# Patient Record
Sex: FEMALE | Race: WHITE
[De-identification: names, ages, dates, MRNs, and addresses within clinical notes are randomized per-mention and may not be internally consistent; named-entity substitution may affect disease eponyms.]

---

## 2019-08-24 ENCOUNTER — HOSPITAL ENCOUNTER (EMERGENCY)
Dept: HOSPITAL 4 - SED | Age: 39
LOS: 1 days | Discharge: HOME | End: 2019-08-25
Payer: COMMERCIAL

## 2019-08-24 VITALS — HEIGHT: 66 IN | WEIGHT: 180 LBS | BODY MASS INDEX: 28.93 KG/M2

## 2019-08-24 VITALS — SYSTOLIC BLOOD PRESSURE: 168 MMHG

## 2019-08-24 DIAGNOSIS — Z79.899: ICD-10-CM

## 2019-08-24 DIAGNOSIS — G43.A0: Primary | ICD-10-CM

## 2019-08-24 LAB
ALBUMIN SERPL BCP-MCNC: 4.6 G/DL (ref 3.4–4.8)
ALT SERPL W P-5'-P-CCNC: 38 U/L (ref 12–78)
ANION GAP SERPL CALCULATED.3IONS-SCNC: 16 MMOL/L (ref 5–15)
AST SERPL W P-5'-P-CCNC: 17 U/L (ref 10–37)
BASOPHILS NFR BLD MANUAL: 0 % (ref 0–2)
BILIRUB SERPL-MCNC: 0.5 MG/DL (ref 0–1)
BUN SERPL-MCNC: 14 MG/DL (ref 8–21)
CALCIUM SERPL-MCNC: 11.4 MG/DL (ref 8.4–11)
CHLORIDE SERPL-SCNC: 107 MMOL/L (ref 98–107)
CREAT SERPL-MCNC: 1.2 MG/DL (ref 0.55–1.3)
EOSINOPHIL NFR BLD MANUAL: 0 % (ref 0–7)
ERYTHROCYTE [DISTWIDTH] IN BLOOD BY AUTOMATED COUNT: 15.3 % (ref 9–15)
GFR SERPL CREATININE-BSD FRML MDRD: 65 ML/MIN (ref 90–?)
GLUCOSE SERPL-MCNC: 174 MG/DL (ref 70–99)
HCT VFR BLD AUTO: 43.6 % (ref 36–48)
HGB BLD-MCNC: 14.7 G/DL (ref 12–16)
LIPASE SERPL-CCNC: 176 U/L (ref 73–393)
LYMPHOCYTES NFR BLD MANUAL: 9 % (ref 20–46)
MCH RBC QN AUTO: 29 PG (ref 27–31)
MCHC RBC AUTO-ENTMCNC: 34 % (ref 32–36)
MCV RBC AUTO: 86 FL (ref 79–98)
MONOCYTES # BLD MANUAL: 1 % (ref 0–11)
NEUTS BAND NFR BLD MANUAL: 2 % (ref 0–6)
PLATELET # BLD AUTO: 409 K/UL (ref 130–430)
POTASSIUM SERPL-SCNC: 3.2 MMOL/L (ref 3.5–5.1)
RBC # BLD AUTO: 5.08 MIL/UL (ref 4.2–6.2)
SODIUM SERPLBLD-SCNC: 143 MMOL/L (ref 136–145)
WBC # BLD AUTO: 22.2 K/UL (ref 4.8–10.8)

## 2019-08-24 PROCEDURE — 83690 ASSAY OF LIPASE: CPT

## 2019-08-24 PROCEDURE — 85025 COMPLETE CBC W/AUTO DIFF WBC: CPT

## 2019-08-24 PROCEDURE — 96374 THER/PROPH/DIAG INJ IV PUSH: CPT

## 2019-08-24 PROCEDURE — 87040 BLOOD CULTURE FOR BACTERIA: CPT

## 2019-08-24 PROCEDURE — 85027 COMPLETE CBC AUTOMATED: CPT

## 2019-08-24 PROCEDURE — 96361 HYDRATE IV INFUSION ADD-ON: CPT

## 2019-08-24 PROCEDURE — 99283 EMERGENCY DEPT VISIT LOW MDM: CPT

## 2019-08-24 PROCEDURE — C9113 INJ PANTOPRAZOLE SODIUM, VIA: HCPCS

## 2019-08-24 PROCEDURE — 80053 COMPREHEN METABOLIC PANEL: CPT

## 2019-08-24 PROCEDURE — 83605 ASSAY OF LACTIC ACID: CPT

## 2019-08-24 PROCEDURE — 85007 BL SMEAR W/DIFF WBC COUNT: CPT

## 2019-08-24 PROCEDURE — 36415 COLL VENOUS BLD VENIPUNCTURE: CPT

## 2019-08-24 PROCEDURE — 81025 URINE PREGNANCY TEST: CPT

## 2019-08-24 PROCEDURE — 96375 TX/PRO/DX INJ NEW DRUG ADDON: CPT

## 2019-08-24 PROCEDURE — 96376 TX/PRO/DX INJ SAME DRUG ADON: CPT

## 2019-08-24 PROCEDURE — 81002 URINALYSIS NONAUTO W/O SCOPE: CPT

## 2019-08-24 NOTE — NUR
Dr Garcia made aware WBC elevated and verify if he wants to order 
antibiotic.awaiting for new order.

## 2019-08-24 NOTE — NUR
# 20 gauge angiocath placed to left wrist.  Use of asceptic technique.  Opsite 
placed over site.  Blood return noted.  Blood for lab drawn from site.  Flushed 
with 10 cc of normal saline.  No evidence of infiltration noted.  Patient 
tolerated well.

## 2019-08-24 NOTE — NUR
patient arrived Aox4 with c/o vomiting all day. patient states she has a 
history of IBS. patient is a poor historian states vomiting has gone on for 8 
years uncontrollable. patient has distant bowelsounds throughout abd. patient 
is diaphoretic and breathing fast. applied comfort/calming measures. denies 
loose stool. patient denies drug use of any kind. no other complaint or injury 
at this time.

## 2019-08-24 NOTE — NUR
# 22 gauge angiocath placed to right hand. Use of asceptic technique.  Opsite 
placed over site.  Blood return noted.  Blood for lab drawn from site.  Flushed 
with 10 cc of normal saline.  No evidence of infiltration noted.  Patient 
tolerated well.

## 2019-08-25 VITALS — SYSTOLIC BLOOD PRESSURE: 140 MMHG

## 2019-08-25 LAB
ALBUMIN SERPL BCP-MCNC: 3.7 G/DL (ref 3.4–4.8)
ALT SERPL W P-5'-P-CCNC: 30 U/L (ref 12–78)
ANION GAP SERPL CALCULATED.3IONS-SCNC: 11 MMOL/L (ref 5–15)
AST SERPL W P-5'-P-CCNC: 15 U/L (ref 10–37)
BASOPHILS # BLD AUTO: 0 K/UL (ref 0–0.2)
BASOPHILS NFR BLD AUTO: 0.2 % (ref 0–2)
BILIRUB SERPL-MCNC: 0.3 MG/DL (ref 0–1)
BUN SERPL-MCNC: 12 MG/DL (ref 8–21)
CALCIUM SERPL-MCNC: 9 MG/DL (ref 8.4–11)
CHLORIDE SERPL-SCNC: 111 MMOL/L (ref 98–107)
CREAT SERPL-MCNC: 0.9 MG/DL (ref 0.55–1.3)
EOSINOPHIL # BLD AUTO: 0 K/UL (ref 0–0.4)
EOSINOPHIL NFR BLD AUTO: 0.1 % (ref 0–4)
ERYTHROCYTE [DISTWIDTH] IN BLOOD BY AUTOMATED COUNT: 15.4 % (ref 9–15)
GFR SERPL CREATININE-BSD FRML MDRD: 91 ML/MIN (ref 90–?)
GLUCOSE SERPL-MCNC: 165 MG/DL (ref 70–99)
HCT VFR BLD AUTO: 37.3 % (ref 36–48)
HGB BLD-MCNC: 12.7 G/DL (ref 12–16)
LYMPHOCYTES # BLD AUTO: 0.8 K/UL (ref 1–5.5)
LYMPHOCYTES NFR BLD AUTO: 4 % (ref 20.5–51.5)
MCH RBC QN AUTO: 29 PG (ref 27–31)
MCHC RBC AUTO-ENTMCNC: 34 % (ref 32–36)
MCV RBC AUTO: 86 FL (ref 79–98)
MONOCYTES # BLD MANUAL: 0.2 K/UL (ref 0–1)
MONOCYTES # BLD MANUAL: 1.3 % (ref 1.7–9.3)
NEUTROPHILS # BLD AUTO: 17.9 K/UL (ref 1.8–7.7)
NEUTROPHILS NFR BLD AUTO: 94.4 % (ref 40–70)
PLATELET # BLD AUTO: 289 K/UL (ref 130–430)
POTASSIUM SERPL-SCNC: 3.9 MMOL/L (ref 3.5–5.1)
RBC # BLD AUTO: 4.34 MIL/UL (ref 4.2–6.2)
SODIUM SERPLBLD-SCNC: 145 MMOL/L (ref 136–145)
WBC # BLD AUTO: 18.9 K/UL (ref 4.8–10.8)

## 2019-08-25 NOTE — NUR
Patient given written and verbal discharge instructions and verbalizes 
understanding.  ER MD discussed with patient the results and treatment 
provided. Patient in stable condition. ID arm band removed. IV catheter removed 
intact and dressing applied, no active bleeding. Rx of Compazine and Benadryl 
given. Patient educated on pain management and to follow up with PMD. Pain 
Scale 0/10 Opportunity for questions provided and answered. Medication side 
effect fact sheet provided.

## 2023-03-18 ENCOUNTER — HOSPITAL ENCOUNTER (INPATIENT)
Dept: HOSPITAL 4 - SED | Age: 43
LOS: 3 days | Discharge: HOME | DRG: 249 | End: 2023-03-21
Attending: FAMILY MEDICINE | Admitting: FAMILY MEDICINE
Payer: COMMERCIAL

## 2023-03-18 VITALS — BODY MASS INDEX: 25.39 KG/M2 | HEIGHT: 66 IN | WEIGHT: 158 LBS

## 2023-03-18 VITALS — SYSTOLIC BLOOD PRESSURE: 178 MMHG

## 2023-03-18 DIAGNOSIS — K58.9: ICD-10-CM

## 2023-03-18 DIAGNOSIS — R74.01: ICD-10-CM

## 2023-03-18 DIAGNOSIS — R65.10: ICD-10-CM

## 2023-03-18 DIAGNOSIS — K21.00: ICD-10-CM

## 2023-03-18 DIAGNOSIS — Z20.822: ICD-10-CM

## 2023-03-18 DIAGNOSIS — Z79.899: ICD-10-CM

## 2023-03-18 DIAGNOSIS — R11.15: Primary | ICD-10-CM

## 2023-03-18 DIAGNOSIS — Z90.49: ICD-10-CM

## 2023-03-18 DIAGNOSIS — E87.6: ICD-10-CM

## 2023-03-18 DIAGNOSIS — E88.09: ICD-10-CM

## 2023-03-18 DIAGNOSIS — R73.9: ICD-10-CM

## 2023-03-18 DIAGNOSIS — R10.13: ICD-10-CM

## 2023-03-18 PROCEDURE — C9113 INJ PANTOPRAZOLE SODIUM, VIA: HCPCS

## 2023-03-19 VITALS — SYSTOLIC BLOOD PRESSURE: 122 MMHG

## 2023-03-19 VITALS — SYSTOLIC BLOOD PRESSURE: 135 MMHG

## 2023-03-19 VITALS — SYSTOLIC BLOOD PRESSURE: 143 MMHG

## 2023-03-19 VITALS — SYSTOLIC BLOOD PRESSURE: 161 MMHG

## 2023-03-19 VITALS — SYSTOLIC BLOOD PRESSURE: 139 MMHG

## 2023-03-19 VITALS — SYSTOLIC BLOOD PRESSURE: 140 MMHG

## 2023-03-19 LAB
ALBUMIN SERPL BCP-MCNC: 4.5 G/DL (ref 3.4–4.8)
ALT SERPL W P-5'-P-CCNC: 111 U/L (ref 12–78)
ANION GAP SERPL CALCULATED.3IONS-SCNC: 15 MMOL/L (ref 5–15)
AST SERPL W P-5'-P-CCNC: 59 U/L (ref 10–37)
BASOPHILS NFR BLD MANUAL: 0 % (ref 0–2)
BILIRUB SERPL-MCNC: 0.3 MG/DL (ref 0–1)
BUN SERPL-MCNC: 6 MG/DL (ref 8–21)
CALCIUM SERPL-MCNC: 10.1 MG/DL (ref 8.4–11)
CHLORIDE SERPL-SCNC: 96 MMOL/L (ref 98–107)
CREAT SERPL-MCNC: 0.98 MG/DL (ref 0.55–1.3)
EOSINOPHIL NFR BLD MANUAL: 0 % (ref 0–7)
ERYTHROCYTE [DISTWIDTH] IN BLOOD BY AUTOMATED COUNT: 14.6 % (ref 9–15)
GFR SERPL CREATININE-BSD FRML MDRD: 80 ML/MIN (ref 90–?)
GLUCOSE SERPL-MCNC: 186 MG/DL (ref 70–99)
HCT VFR BLD AUTO: 46.2 % (ref 36–48)
HGB BLD-MCNC: 15.5 G/DL (ref 12–16)
INR PPP: 1.1 (ref 0.8–1.2)
INR PPP: 1.1 (ref 0.8–1.2)
LIPASE SERPL-CCNC: 227 U/L (ref 73–393)
LYMPHOCYTES NFR BLD MANUAL: 3 % (ref 20–46)
MCH RBC QN AUTO: 29 PG (ref 27–31)
MCHC RBC AUTO-ENTMCNC: 34 % (ref 32–36)
MCV RBC AUTO: 87 FL (ref 79–98)
MONOCYTES # BLD MANUAL: 1 % (ref 0–11)
PLATELET # BLD AUTO: 332 K/UL (ref 130–430)
PROTHROMBIN TIME: 11.1 SECS (ref 9.5–12.5)
PROTHROMBIN TIME: 11.4 SECS (ref 9.5–12.5)
RBC # BLD AUTO: 5.31 MIL/UL (ref 4.2–6.2)
WBC # BLD AUTO: 20.7 K/UL (ref 4.8–10.8)

## 2023-03-19 RX ADMIN — SODIUM CHLORIDE SCH MG: 9 INJECTION, SOLUTION INTRAVENOUS at 20:21

## 2023-03-19 RX ADMIN — KETOROLAC TROMETHAMINE PRN MG: 30 INJECTION, SOLUTION INTRAMUSCULAR; INTRAVENOUS at 07:02

## 2023-03-19 RX ADMIN — SUCRALFATE SCH GM: 1 TABLET ORAL at 20:21

## 2023-03-19 RX ADMIN — DICYCLOMINE HYDROCHLORIDE SCH MG: 10 CAPSULE ORAL at 20:21

## 2023-03-19 RX ADMIN — SODIUM CHLORIDE SCH MG: 9 INJECTION, SOLUTION INTRAVENOUS at 09:00

## 2023-03-20 VITALS — SYSTOLIC BLOOD PRESSURE: 144 MMHG

## 2023-03-20 VITALS — SYSTOLIC BLOOD PRESSURE: 150 MMHG

## 2023-03-20 VITALS — SYSTOLIC BLOOD PRESSURE: 139 MMHG

## 2023-03-20 VITALS — SYSTOLIC BLOOD PRESSURE: 152 MMHG

## 2023-03-20 LAB
ALBUMIN SERPL BCP-MCNC: 3.3 G/DL (ref 3.4–4.8)
ALT SERPL W P-5'-P-CCNC: 81 U/L (ref 12–78)
AMPHETAMINES UR QL SCN: NEGATIVE
ANION GAP SERPL CALCULATED.3IONS-SCNC: 9 MMOL/L (ref 5–15)
AST SERPL W P-5'-P-CCNC: 38 U/L (ref 10–37)
BARBITURATES UR QL SCN: NEGATIVE
BASOPHILS # BLD AUTO: 0 K/UL (ref 0–0.2)
BASOPHILS NFR BLD AUTO: 0.1 % (ref 0–2)
BENZODIAZ UR QL SCN: NEGATIVE
BILIRUB SERPL-MCNC: 0.5 MG/DL (ref 0–1)
BUN SERPL-MCNC: 7 MG/DL (ref 8–21)
BZE UR QL SCN: NEGATIVE
CALCIUM SERPL-MCNC: 9.2 MG/DL (ref 8.4–11)
CANNABINOIDS UR QL SCN: NEGATIVE
CHLORIDE SERPL-SCNC: 101 MMOL/L (ref 98–107)
CREAT SERPL-MCNC: 0.93 MG/DL (ref 0.55–1.3)
EOSINOPHIL # BLD AUTO: 0.1 K/UL (ref 0–0.4)
EOSINOPHIL NFR BLD AUTO: 0.5 % (ref 0–4)
ERYTHROCYTE [DISTWIDTH] IN BLOOD BY AUTOMATED COUNT: 14.7 % (ref 9–15)
GFR SERPL CREATININE-BSD FRML MDRD: 85 ML/MIN (ref 90–?)
GLUCOSE SERPL-MCNC: 128 MG/DL (ref 70–99)
HCT VFR BLD AUTO: 38 % (ref 36–48)
HGB BLD-MCNC: 12.9 G/DL (ref 12–16)
INR PPP: 1.2 (ref 0.8–1.2)
LIPASE SERPL-CCNC: 227 U/L (ref 73–393)
LYMPHOCYTES # BLD AUTO: 2.8 K/UL (ref 1–5.5)
LYMPHOCYTES NFR BLD AUTO: 20.9 % (ref 20.5–51.5)
MCH RBC QN AUTO: 30 PG (ref 27–31)
MCHC RBC AUTO-ENTMCNC: 34 % (ref 32–36)
MCV RBC AUTO: 87 FL (ref 79–98)
METHADONE UR-SCNC: NEGATIVE UMOL/L
METHAMPHET UR-SCNC: NEGATIVE UMOL/L
MONOCYTES # BLD MANUAL: 1.1 K/UL (ref 0–1)
MONOCYTES # BLD MANUAL: 8.3 % (ref 1.7–9.3)
NEUTROPHILS # BLD AUTO: 9.3 K/UL (ref 1.8–7.7)
NEUTROPHILS NFR BLD AUTO: 70.2 % (ref 40–70)
OPIATES UR QL SCN: NEGATIVE
OXYCODONE SERPL-MCNC: NEGATIVE NG/ML
PCP UR QL SCN: NEGATIVE
PHOSPHATE SERPL-MCNC: 3.5 MG/DL (ref 2.7–4.5)
PLATELET # BLD AUTO: 260 K/UL (ref 130–430)
PROTHROMBIN TIME: 11.8 SECS (ref 9.5–12.5)
RBC # BLD AUTO: 4.36 MIL/UL (ref 4.2–6.2)
TIBC SERPL-MCNC: 248 UG/DL (ref 250–450)
TRICYCLICS UR-MCNC: NEGATIVE NG/ML
URINE PROPOXYPHENE SCREEN: NEGATIVE
WBC # BLD AUTO: 13.2 K/UL (ref 4.8–10.8)

## 2023-03-20 RX ADMIN — SODIUM CHLORIDE SCH MG: 9 INJECTION, SOLUTION INTRAVENOUS at 08:43

## 2023-03-20 RX ADMIN — DICYCLOMINE HYDROCHLORIDE SCH MG: 10 CAPSULE ORAL at 14:53

## 2023-03-20 RX ADMIN — DEXTROSE AND SODIUM CHLORIDE SCH MLS/HR: 5; 450 INJECTION, SOLUTION INTRAVENOUS at 11:57

## 2023-03-20 RX ADMIN — SODIUM CHLORIDE SCH MG: 9 INJECTION, SOLUTION INTRAVENOUS at 22:06

## 2023-03-20 RX ADMIN — SUCRALFATE SCH GM: 1 TABLET ORAL at 06:18

## 2023-03-20 RX ADMIN — SUCRALFATE SCH GM: 1 TABLET ORAL at 17:33

## 2023-03-20 RX ADMIN — KETOROLAC TROMETHAMINE PRN MG: 30 INJECTION, SOLUTION INTRAMUSCULAR; INTRAVENOUS at 02:12

## 2023-03-20 RX ADMIN — KETOROLAC TROMETHAMINE PRN MG: 30 INJECTION, SOLUTION INTRAMUSCULAR; INTRAVENOUS at 14:54

## 2023-03-20 RX ADMIN — DEXTROSE AND SODIUM CHLORIDE SCH MLS/HR: 5; 450 INJECTION, SOLUTION INTRAVENOUS at 14:57

## 2023-03-20 RX ADMIN — DEXTROSE AND SODIUM CHLORIDE SCH MLS/HR: 5; 450 INJECTION, SOLUTION INTRAVENOUS at 22:08

## 2023-03-20 RX ADMIN — DEXTROSE AND SODIUM CHLORIDE SCH MLS/HR: 5; 450 INJECTION, SOLUTION INTRAVENOUS at 02:15

## 2023-03-20 RX ADMIN — DICYCLOMINE HYDROCHLORIDE SCH MG: 10 CAPSULE ORAL at 22:06

## 2023-03-20 RX ADMIN — KETOROLAC TROMETHAMINE PRN MG: 30 INJECTION, SOLUTION INTRAMUSCULAR; INTRAVENOUS at 08:45

## 2023-03-20 RX ADMIN — DICYCLOMINE HYDROCHLORIDE SCH MG: 10 CAPSULE ORAL at 08:43

## 2023-03-21 VITALS — SYSTOLIC BLOOD PRESSURE: 136 MMHG

## 2023-03-21 VITALS — SYSTOLIC BLOOD PRESSURE: 140 MMHG

## 2023-03-21 VITALS — SYSTOLIC BLOOD PRESSURE: 158 MMHG

## 2023-03-21 LAB
A1AT SERPL-MCNC: 144 MG/DL (ref 101–187)
BASOPHILS # BLD AUTO: 0 K/UL (ref 0–0.2)
BASOPHILS NFR BLD AUTO: 0.1 % (ref 0–2)
EOSINOPHIL # BLD AUTO: 0.1 K/UL (ref 0–0.4)
EOSINOPHIL NFR BLD AUTO: 0.5 % (ref 0–4)
ERYTHROCYTE [DISTWIDTH] IN BLOOD BY AUTOMATED COUNT: 14.6 % (ref 9–15)
HCT VFR BLD AUTO: 37.7 % (ref 36–48)
HGB BLD-MCNC: 12.6 G/DL (ref 12–16)
LYMPHOCYTES # BLD AUTO: 2.7 K/UL (ref 1–5.5)
LYMPHOCYTES NFR BLD AUTO: 21.9 % (ref 20.5–51.5)
MCH RBC QN AUTO: 30 PG (ref 27–31)
MCHC RBC AUTO-ENTMCNC: 33 % (ref 32–36)
MCV RBC AUTO: 89 FL (ref 79–98)
MONOCYTES # BLD MANUAL: 0.9 K/UL (ref 0–1)
MONOCYTES # BLD MANUAL: 7 % (ref 1.7–9.3)
NEUTROPHILS # BLD AUTO: 8.8 K/UL (ref 1.8–7.7)
NEUTROPHILS NFR BLD AUTO: 70.5 % (ref 40–70)
PLATELET # BLD AUTO: 222 K/UL (ref 130–430)
RBC # BLD AUTO: 4.23 MIL/UL (ref 4.2–6.2)
WBC # BLD AUTO: 12.5 K/UL (ref 4.8–10.8)

## 2023-03-21 RX ADMIN — SODIUM CHLORIDE SCH MG: 9 INJECTION, SOLUTION INTRAVENOUS at 08:06

## 2023-03-21 RX ADMIN — KETOROLAC TROMETHAMINE PRN MG: 30 INJECTION, SOLUTION INTRAMUSCULAR; INTRAVENOUS at 01:22

## 2023-03-21 RX ADMIN — SUCRALFATE SCH GM: 1 TABLET ORAL at 06:40

## 2023-03-21 RX ADMIN — DICYCLOMINE HYDROCHLORIDE SCH MG: 10 CAPSULE ORAL at 12:15

## 2023-03-21 RX ADMIN — DEXTROSE AND SODIUM CHLORIDE SCH MLS/HR: 5; 450 INJECTION, SOLUTION INTRAVENOUS at 09:12

## 2023-03-21 RX ADMIN — KETOROLAC TROMETHAMINE PRN MG: 30 INJECTION, SOLUTION INTRAMUSCULAR; INTRAVENOUS at 07:56

## 2023-03-23 LAB
ACTIN IGG SERPL-ACNC: 9 UNITS (ref 0–19)
C-ANCA TITR SER IF: (no result) TITER
P-ANCA TITR SER IF: (no result) TITER

## 2023-04-06 LAB — HCV AB S/CO SERPL IA: (no result) S/CO (ref 0–0.7)

## 2023-04-11 ENCOUNTER — HOSPITAL ENCOUNTER (INPATIENT)
Dept: HOSPITAL 4 - SED | Age: 43
LOS: 9 days | Discharge: HOME HEALTH | DRG: 254 | End: 2023-04-20
Payer: COMMERCIAL

## 2023-04-11 VITALS — HEIGHT: 66 IN | BODY MASS INDEX: 22.98 KG/M2 | WEIGHT: 143 LBS

## 2023-04-11 VITALS — SYSTOLIC BLOOD PRESSURE: 142 MMHG

## 2023-04-11 VITALS — SYSTOLIC BLOOD PRESSURE: 131 MMHG

## 2023-04-11 VITALS — SYSTOLIC BLOOD PRESSURE: 145 MMHG

## 2023-04-11 VITALS — SYSTOLIC BLOOD PRESSURE: 138 MMHG

## 2023-04-11 VITALS — SYSTOLIC BLOOD PRESSURE: 155 MMHG

## 2023-04-11 DIAGNOSIS — E44.0: ICD-10-CM

## 2023-04-11 DIAGNOSIS — K31.84: Primary | ICD-10-CM

## 2023-04-11 DIAGNOSIS — E86.0: ICD-10-CM

## 2023-04-11 DIAGNOSIS — K58.0: ICD-10-CM

## 2023-04-11 DIAGNOSIS — K21.9: ICD-10-CM

## 2023-04-11 DIAGNOSIS — Z20.822: ICD-10-CM

## 2023-04-11 DIAGNOSIS — K85.90: ICD-10-CM

## 2023-04-11 DIAGNOSIS — F41.1: ICD-10-CM

## 2023-04-11 DIAGNOSIS — Z90.49: ICD-10-CM

## 2023-04-11 DIAGNOSIS — R11.15: ICD-10-CM

## 2023-04-11 DIAGNOSIS — E87.1: ICD-10-CM

## 2023-04-11 DIAGNOSIS — R65.10: ICD-10-CM

## 2023-04-11 DIAGNOSIS — E87.6: ICD-10-CM

## 2023-04-11 LAB
ALBUMIN SERPL BCP-MCNC: 4.5 G/DL (ref 3.4–4.8)
ALT SERPL W P-5'-P-CCNC: 380 U/L (ref 12–78)
AMPHETAMINES UR QL SCN: NEGATIVE
ANION GAP SERPL CALCULATED.3IONS-SCNC: 17 MMOL/L (ref 5–15)
APAP SERPL-MCNC: < 1 UG/ML (ref 1–30)
APPEARANCE UR: CLEAR
AST SERPL W P-5'-P-CCNC: 55 U/L (ref 10–37)
BARBITURATES UR QL SCN: NEGATIVE
BASOPHILS # BLD AUTO: 0 K/UL (ref 0–0.2)
BASOPHILS NFR BLD AUTO: 0.1 % (ref 0–2)
BENZODIAZ UR QL SCN: NEGATIVE
BILIRUB SERPL-MCNC: 1 MG/DL (ref 0–1)
BILIRUB UR QL STRIP: NEGATIVE
BUN SERPL-MCNC: 5 MG/DL (ref 8–21)
BZE UR QL SCN: NEGATIVE
CALCIUM SERPL-MCNC: 10.5 MG/DL (ref 8.4–11)
CANNABINOIDS UR QL SCN: NEGATIVE
CHLORIDE SERPL-SCNC: 96 MMOL/L (ref 98–107)
COLOR UR: YELLOW
CREAT SERPL-MCNC: 0.83 MG/DL (ref 0.55–1.3)
EOSINOPHIL # BLD AUTO: 0 K/UL (ref 0–0.4)
EOSINOPHIL NFR BLD AUTO: 0.1 % (ref 0–4)
ERYTHROCYTE [DISTWIDTH] IN BLOOD BY AUTOMATED COUNT: 15.4 % (ref 9–15)
GFR SERPL CREATININE-BSD FRML MDRD: 97 ML/MIN (ref 90–?)
GLUCOSE SERPL-MCNC: 155 MG/DL (ref 70–99)
GLUCOSE UR STRIP-MCNC: NEGATIVE MG/DL
HCT VFR BLD AUTO: 44.4 % (ref 36–48)
HGB BLD-MCNC: 15.6 G/DL (ref 12–16)
HGB UR QL STRIP: NEGATIVE
INR PPP: 1.9 (ref 0.8–1.2)
KETONES UR STRIP-MCNC: (no result) MG/DL
LEUKOCYTE ESTERASE UR QL STRIP: NEGATIVE
LYMPHOCYTES # BLD AUTO: 1.2 K/UL (ref 1–5.5)
LYMPHOCYTES NFR BLD AUTO: 7.6 % (ref 20.5–51.5)
MCH RBC QN AUTO: 30 PG (ref 27–31)
MCHC RBC AUTO-ENTMCNC: 35 % (ref 32–36)
MCV RBC AUTO: 86 FL (ref 79–98)
METHADONE UR-SCNC: NEGATIVE UMOL/L
METHAMPHET UR-SCNC: NEGATIVE UMOL/L
MONOCYTES # BLD MANUAL: 0.6 K/UL (ref 0–1)
MONOCYTES # BLD MANUAL: 4 % (ref 1.7–9.3)
NEUTROPHILS # BLD AUTO: 14.3 K/UL (ref 1.8–7.7)
NEUTROPHILS NFR BLD AUTO: 88.2 % (ref 40–70)
NITRITE UR QL STRIP: NEGATIVE
OPIATES UR QL SCN: POSITIVE
OXYCODONE SERPL-MCNC: NEGATIVE NG/ML
PCP UR QL SCN: NEGATIVE
PH UR STRIP: 7 [PH] (ref 5–8)
PLATELET # BLD AUTO: 376 K/UL (ref 130–430)
PROT UR QL STRIP: NEGATIVE
PROTHROMBIN TIME: 18.7 SECS (ref 9.5–12.5)
RBC # BLD AUTO: 5.18 MIL/UL (ref 4.2–6.2)
SP GR UR STRIP: 1.02 (ref 1–1.03)
TIBC SERPL-MCNC: 238 UG/DL (ref 250–450)
TRICYCLICS UR-MCNC: NEGATIVE NG/ML
URINE PROPOXYPHENE SCREEN: NEGATIVE
UROBILINOGEN UR STRIP-MCNC: 0.2 MG/DL (ref 0.2–1)
WBC # BLD AUTO: 16.2 K/UL (ref 4.8–10.8)

## 2023-04-11 PROCEDURE — C9113 INJ PANTOPRAZOLE SODIUM, VIA: HCPCS

## 2023-04-11 PROCEDURE — A9541 TC99M SULFUR COLLOID: HCPCS

## 2023-04-11 PROCEDURE — G0481 DRUG TEST DEF 8-14 CLASSES: HCPCS

## 2023-04-11 PROCEDURE — G0480 DRUG TEST DEF 1-7 CLASSES: HCPCS

## 2023-04-11 RX ADMIN — TAZOBACTAM SODIUM AND PIPERACILLIN SODIUM SCH MLS/HR: 375; 3 INJECTION, SOLUTION INTRAVENOUS at 10:56

## 2023-04-11 RX ADMIN — TAZOBACTAM SODIUM AND PIPERACILLIN SODIUM SCH MLS/HR: 375; 3 INJECTION, SOLUTION INTRAVENOUS at 17:49

## 2023-04-11 RX ADMIN — TAZOBACTAM SODIUM AND PIPERACILLIN SODIUM SCH MLS/HR: 375; 3 INJECTION, SOLUTION INTRAVENOUS at 11:08

## 2023-04-11 RX ADMIN — METOCLOPRAMIDE PRN MG: 5 INJECTION, SOLUTION INTRAMUSCULAR; INTRAVENOUS at 21:43

## 2023-04-11 RX ADMIN — SODIUM CHLORIDE SCH MG: 9 INJECTION, SOLUTION INTRAVENOUS at 09:40

## 2023-04-11 RX ADMIN — METOCLOPRAMIDE PRN MG: 5 INJECTION, SOLUTION INTRAMUSCULAR; INTRAVENOUS at 11:37

## 2023-04-11 RX ADMIN — MORPHINE SULFATE PRN MG: 2 INJECTION, SOLUTION INTRAMUSCULAR; INTRAVENOUS at 11:37

## 2023-04-11 RX ADMIN — MORPHINE SULFATE PRN MG: 2 INJECTION, SOLUTION INTRAMUSCULAR; INTRAVENOUS at 06:33

## 2023-04-11 RX ADMIN — MORPHINE SULFATE PRN MG: 2 INJECTION, SOLUTION INTRAMUSCULAR; INTRAVENOUS at 21:44

## 2023-04-11 NOTE — NUR
Patient will be admitted to care of DR GONZALEZ.  Admitted to MED SURGE unit.  
Will go to room 110B.  Belongings list completed.  Complete and up to date 
summary report printed. SBAR report to be given at bedside with opportunity for 
questions.

## 2023-04-11 NOTE — NUR
PT BIB MOTHER FROM HOME, AMBULATED TO BED 8. PT A&Ox4, ABLE TO MAKE NEEDS 
KNOWN. PT C/O OF AND ON ABD PAIN AND VOMITING X6 WEEKS. PT RATES PAIN 10/10. PT 
DENIES SOB AND CHEST PAIN. PT DENIES FEVER/CHILLS. PT WAS RECENTLY SEEN AT [facility]. PT HAS A HISTORY OF GERD AND IBS. SAFETY MEASURES IN PLACE.

## 2023-04-11 NOTE — NUR
PT RESTING WITH EYES CLOSED, NO S/S OF DISCOMFORT NOTED AT THIS TIME. ENDORSED 
PT AND CARE TO ONCOMING NURSE ABIMAEL CAT

## 2023-04-11 NOTE — NUR
RECEIVED PT IN BED, AOX4, EVEN AND UNLABORED, DENIED SOB, CP OR ANY PAIN ATT, NO N/V, IN 
STABLE CONDITION. WILL CONTINUE WITH POC

## 2023-04-11 NOTE — NUR
UNABLE TO RECONCILE MEDS AS PT'S MOTHER TOOK MED LIST HOME AND PT DID NOT WANT 
NURSE TO CALL MOTHER. PT STATED SHE WILL PROVIDE MED LIST IN THE MORNING.

## 2023-04-11 NOTE — NUR
Admit bed requested 



Patient will be admitted to care of .  

Admitted to MED-SURGE unit.

Diagnosis VOMITING AND ABD PAIN

Inpatient (Yes or No)  YES

Observation (Yes or No) NO

Orientation concerns or request close to nursing station  (Yes or No) NO

Covid Status PENDING

On vent or bipap NO

Isolation requirements NO

Needs a sitter NO

From Home (Yes or if No enter name of facility) HOME

Requires Dialysis (Yes or No) NO 

Med Rec Completed (Yes of No) UNABLE TO OBTAIN AS PT'S MOTHER LEFT FOR THE 
NIGHT. PT STATES WILL GET MED REC IN THE MORNING.

## 2023-04-11 NOTE — NUR
Note

Dr Wagner (GI) was at pt's bedside assessing pt and checking abdomen. Pt does not remember 
any of the hospitals or results of tests done, all pt remembers was the tests all were 
negative for any proper diagnosis. The two hospitals that pt remembers she was at is 1) 
[facility] and 2) [facility].

## 2023-04-11 NOTE — NUR
K=3L

Potassium 40meq PO was held - not given as pt is NPO, due to frequent emesis and nausea, 
even though Zofran IVP and Reglan IVP were given throughout the shift.

## 2023-04-11 NOTE — NUR
ADMIT PT

Received report from Laureen RN - Resource RN on floor at this time. Admission assessment 
being completed at this time. Pt's IV in left Forearm intact and patent. Pt still feels 
nauseated and feels like she is going to have an emesis at this time. Pt was oriented to 
room and nursing routines/procedures. Questions/concerns were answered. Call light within 
reach. Bed in low position and bed alarm on. Side rails raised.

## 2023-04-11 NOTE — NUR
Note

Request for medical records was sent to these two [facility] (faxed by Jhon - monitor 
tech).

## 2023-04-12 VITALS — SYSTOLIC BLOOD PRESSURE: 107 MMHG

## 2023-04-12 VITALS — SYSTOLIC BLOOD PRESSURE: 110 MMHG

## 2023-04-12 VITALS — SYSTOLIC BLOOD PRESSURE: 130 MMHG

## 2023-04-12 VITALS — SYSTOLIC BLOOD PRESSURE: 131 MMHG

## 2023-04-12 VITALS — SYSTOLIC BLOOD PRESSURE: 126 MMHG

## 2023-04-12 LAB
A1AT SERPL-MCNC: 149 MG/DL (ref 101–187)
ANION GAP SERPL CALCULATED.3IONS-SCNC: 8 MMOL/L (ref 5–15)
BASOPHILS # BLD AUTO: 0 K/UL (ref 0–0.2)
BASOPHILS NFR BLD AUTO: 0.5 % (ref 0–2)
BUN SERPL-MCNC: 3 MG/DL (ref 8–21)
CALCIUM SERPL-MCNC: 9.1 MG/DL (ref 8.4–11)
CERULOPLASMIN SERPL-MCNC: 21 MG/DL (ref 19–39)
CHLORIDE SERPL-SCNC: 100 MMOL/L (ref 98–107)
CREAT SERPL-MCNC: 0.68 MG/DL (ref 0.55–1.3)
EOSINOPHIL # BLD AUTO: 0.1 K/UL (ref 0–0.4)
EOSINOPHIL NFR BLD AUTO: 1.3 % (ref 0–4)
ERYTHROCYTE [DISTWIDTH] IN BLOOD BY AUTOMATED COUNT: 15.1 % (ref 9–15)
FERRITIN: 97 NG/ML (ref 15–150)
GFR SERPL CREATININE-BSD FRML MDRD: 123 ML/MIN (ref 90–?)
GLUCOSE SERPL-MCNC: 100 MG/DL (ref 70–99)
HCT VFR BLD AUTO: 38.6 % (ref 36–48)
HGB BLD-MCNC: 13.3 G/DL (ref 12–16)
LYMPHOCYTES # BLD AUTO: 2.6 K/UL (ref 1–5.5)
LYMPHOCYTES NFR BLD AUTO: 33 % (ref 20.5–51.5)
MCH RBC QN AUTO: 30 PG (ref 27–31)
MCHC RBC AUTO-ENTMCNC: 34 % (ref 32–36)
MCV RBC AUTO: 88 FL (ref 79–98)
MONOCYTES # BLD MANUAL: 0.6 K/UL (ref 0–1)
MONOCYTES # BLD MANUAL: 8 % (ref 1.7–9.3)
NEUTROPHILS # BLD AUTO: 4.6 K/UL (ref 1.8–7.7)
NEUTROPHILS NFR BLD AUTO: 57.2 % (ref 40–70)
PLATELET # BLD AUTO: 262 K/UL (ref 130–430)
RBC # BLD AUTO: 4.37 MIL/UL (ref 4.2–6.2)
WBC # BLD AUTO: 8 K/UL (ref 4.8–10.8)

## 2023-04-12 RX ADMIN — SODIUM CHLORIDE SCH MLS/HR: 9 INJECTION, SOLUTION INTRAVENOUS at 12:49

## 2023-04-12 RX ADMIN — SODIUM CHLORIDE SCH MLS/HR: 9 INJECTION, SOLUTION INTRAVENOUS at 21:33

## 2023-04-12 RX ADMIN — TAZOBACTAM SODIUM AND PIPERACILLIN SODIUM SCH MLS/HR: 375; 3 INJECTION, SOLUTION INTRAVENOUS at 05:44

## 2023-04-12 RX ADMIN — TAZOBACTAM SODIUM AND PIPERACILLIN SODIUM SCH MLS/HR: 375; 3 INJECTION, SOLUTION INTRAVENOUS at 00:00

## 2023-04-12 RX ADMIN — POTASSIUM CHLORIDE PRN MEQ: 20 TABLET, EXTENDED RELEASE ORAL at 11:13

## 2023-04-12 RX ADMIN — SODIUM CHLORIDE SCH MG: 9 INJECTION, SOLUTION INTRAVENOUS at 09:34

## 2023-04-12 NOTE — NUR
CONSULTATION PAGED/CALLED

Reason for Consultation: []  ANXIETY

Person Who was Notified: []  GABI

Consulting Physician: []   DR MARLYS HOFFMAN

Consultant Specialty: []  PSYCH

Ordering Physician: []  DR GONZALEZ

## 2023-04-12 NOTE — NUR
MD

SPOKE WITH DR GONZALEZ, PATIENT IS NPO AND NEED A NEW ORDER FOR POTASSIUM.  PER DR GONZALEZ, 
NPO EXCEPT FOR MEDS.  NEW ORDERS PLACED

## 2023-04-12 NOTE — NUR
called and spoke with Dr Oconnell. aware that telepsych did not return call, new order for 
ativan 1mg ivp once prn anxiety. states ok to have clear liquids until midnight, then NPO in 
preparation for MRCP.

pt requesting std/sti testing. Dr Oconnell states she will order those tests tomorrow 
morning.

## 2023-04-12 NOTE — NUR
RECEIVED PT IN BED, AOX4, EVEN AND UNLABORED, DENIED SOB, CP OR ANY PAIN ATT, NO N/V, IN 
STABLE CONDITION. tolerating clear LIQUID DIET, NPO FROM [place] FOR MRCP, PT INFORMED, 
VERBALIZED UNDERSTANDING, WILL CONTINUE WITH POC

## 2023-04-12 NOTE — NUR
HIGH ALERT NOTE:

Called Katrin Oro at   identified within the medical roster to verify 
physician authenticity.

## 2023-04-12 NOTE — NUR
PT IN BED, AOX4, DENIED ANY SOB, CP, N/V, SALINE LOCK TO LFA, VOIDING AND NO BM THIS SHIFT, 
WILL ENDORSE TO AM SHIFT

## 2023-04-13 VITALS — SYSTOLIC BLOOD PRESSURE: 119 MMHG

## 2023-04-13 VITALS — SYSTOLIC BLOOD PRESSURE: 128 MMHG

## 2023-04-13 VITALS — SYSTOLIC BLOOD PRESSURE: 136 MMHG

## 2023-04-13 VITALS — SYSTOLIC BLOOD PRESSURE: 125 MMHG

## 2023-04-13 LAB
ALBUMIN SERPL BCP-MCNC: 2.9 G/DL (ref 3.4–4.8)
ALT SERPL W P-5'-P-CCNC: 218 U/L (ref 12–78)
ANION GAP SERPL CALCULATED.3IONS-SCNC: 8 MMOL/L (ref 5–15)
AST SERPL W P-5'-P-CCNC: 41 U/L (ref 10–37)
BASOPHILS # BLD AUTO: 0 K/UL (ref 0–0.2)
BASOPHILS NFR BLD AUTO: 0.4 % (ref 0–2)
BILIRUB DIRECT SERPL-MCNC: 0.2 MG/DL (ref 0–0.3)
BILIRUB SERPL-MCNC: 0.5 MG/DL (ref 0–1)
BUN SERPL-MCNC: 4 MG/DL (ref 8–21)
CALCIUM SERPL-MCNC: 8.8 MG/DL (ref 8.4–11)
CHLORIDE SERPL-SCNC: 101 MMOL/L (ref 98–107)
CREAT SERPL-MCNC: 0.56 MG/DL (ref 0.55–1.3)
EOSINOPHIL # BLD AUTO: 0.1 K/UL (ref 0–0.4)
EOSINOPHIL NFR BLD AUTO: 1.1 % (ref 0–4)
ERYTHROCYTE [DISTWIDTH] IN BLOOD BY AUTOMATED COUNT: 15.1 % (ref 9–15)
GFR SERPL CREATININE-BSD FRML MDRD: 153 ML/MIN (ref 90–?)
GLUCOSE SERPL-MCNC: 82 MG/DL (ref 70–99)
HCT VFR BLD AUTO: 39.4 % (ref 36–48)
HGB BLD-MCNC: 13.4 G/DL (ref 12–16)
LYMPHOCYTES # BLD AUTO: 2.1 K/UL (ref 1–5.5)
LYMPHOCYTES NFR BLD AUTO: 26.9 % (ref 20.5–51.5)
MCH RBC QN AUTO: 30 PG (ref 27–31)
MCHC RBC AUTO-ENTMCNC: 34 % (ref 32–36)
MCV RBC AUTO: 87 FL (ref 79–98)
MONOCYTES # BLD MANUAL: 0.5 K/UL (ref 0–1)
MONOCYTES # BLD MANUAL: 6.9 % (ref 1.7–9.3)
NEUTROPHILS # BLD AUTO: 5.1 K/UL (ref 1.8–7.7)
NEUTROPHILS NFR BLD AUTO: 64.7 % (ref 40–70)
PLATELET # BLD AUTO: 268 K/UL (ref 130–430)
RBC # BLD AUTO: 4.51 MIL/UL (ref 4.2–6.2)
WBC # BLD AUTO: 7.8 K/UL (ref 4.8–10.8)

## 2023-04-13 RX ADMIN — SODIUM CHLORIDE SCH MLS/HR: 9 INJECTION, SOLUTION INTRAVENOUS at 06:25

## 2023-04-13 RX ADMIN — SODIUM CHLORIDE SCH MG: 9 INJECTION, SOLUTION INTRAVENOUS at 08:20

## 2023-04-13 RX ADMIN — SODIUM CHLORIDE SCH MLS/HR: 9 INJECTION, SOLUTION INTRAVENOUS at 16:38

## 2023-04-13 NOTE — NUR
LEFT A VOICE MESSAGE WITH MEDICAL RECORDS, RE:  GI PROCEDURES AND CONSULTATION PER ATTENDING 
MD'S REQUEST,

DR GONZALEZ.

## 2023-04-13 NOTE — NUR
PT IN BED, AOX4, EVEN AND UNLABORED, DENIED SOB, CP, N/V OR ANY PAIN ATT. NPO FROM [place] FOR 
MRCP, PT INFORMED, VERBALIZED UNDERSTANDING, NO CHANGES DURING SHIFT, WILL ENDORSE TO AM 
SHIFT

-------------------------------------------------------------------------------

Addendum: 04/13/23 at 0631 by [employer], RN RN

-------------------------------------------------------------------------------

ON IVF NS  TO [facility]

## 2023-04-13 NOTE — NUR
re: disability assistance



Met with patient at bedside this afternoon.  The patient is a 43 year old female who is 
alert and oriented.  I introduced myself and she was in agreement to speaking with me.  The 
patient resides in a single-story home with her father and her son.  Per patient, she was 
using no assistive devices.  She was independent with her ADLs, able to drive, and still 
working.  She does not have a Power of .  She states her support are her parents and 
her son who is not a minor.  Her PCP is Dr. Calle in [place].  The discharge plan is to return 
home with her father and son.  The patient was advised that prior to discharge, she would be 
assessed for appropriate needs and services.  Per patient, there is a potential need for 
home health services.  The patient is in agreement to the discharge plan.  At time of 
discharge, the patient states her parents or her son will transport her home.



Prior to leaving the room, I inquired about the patient's need for Social Security 
Disability.  Per patient, she has not been working for the past two months due to the 
clinical issues she has been dealing with.  She stated she is looking to apply for SSD.  I 
reviewed SSD information and the application with her.  I advised her of the process and 
explained that her PCP will need to complete a section to support the need for SSD.  I left 
the patient with information on SSD and an application to apply for SSD.  The patient was 
receptive of the information provided.  At this time, there is nothing else needed from 
.

## 2023-04-14 VITALS — SYSTOLIC BLOOD PRESSURE: 127 MMHG

## 2023-04-14 VITALS — SYSTOLIC BLOOD PRESSURE: 129 MMHG

## 2023-04-14 VITALS — SYSTOLIC BLOOD PRESSURE: 133 MMHG

## 2023-04-14 VITALS — SYSTOLIC BLOOD PRESSURE: 125 MMHG

## 2023-04-14 VITALS — SYSTOLIC BLOOD PRESSURE: 143 MMHG

## 2023-04-14 LAB
ALBUMIN SERPL BCP-MCNC: 3 G/DL (ref 3.4–4.8)
ALT SERPL W P-5'-P-CCNC: 187 U/L (ref 12–78)
ANION GAP SERPL CALCULATED.3IONS-SCNC: 7 MMOL/L (ref 5–15)
AST SERPL W P-5'-P-CCNC: 34 U/L (ref 10–37)
BASOPHILS # BLD AUTO: 0 K/UL (ref 0–0.2)
BASOPHILS NFR BLD AUTO: 0.4 % (ref 0–2)
BILIRUB DIRECT SERPL-MCNC: 0.2 MG/DL (ref 0–0.3)
BILIRUB SERPL-MCNC: 0.5 MG/DL (ref 0–1)
BUN SERPL-MCNC: 3 MG/DL (ref 8–21)
CALCIUM SERPL-MCNC: 9 MG/DL (ref 8.4–11)
CHLORIDE SERPL-SCNC: 101 MMOL/L (ref 98–107)
CREAT SERPL-MCNC: 0.57 MG/DL (ref 0.55–1.3)
EOSINOPHIL # BLD AUTO: 0.1 K/UL (ref 0–0.4)
EOSINOPHIL NFR BLD AUTO: 1.5 % (ref 0–4)
ERYTHROCYTE [DISTWIDTH] IN BLOOD BY AUTOMATED COUNT: 14.8 % (ref 9–15)
GFR SERPL CREATININE-BSD FRML MDRD: 150 ML/MIN (ref 90–?)
GLUCOSE SERPL-MCNC: 86 MG/DL (ref 70–99)
HCT VFR BLD AUTO: 40.6 % (ref 36–48)
HGB BLD-MCNC: 13.8 G/DL (ref 12–16)
LIPASE SERPL-CCNC: 722 U/L (ref 73–393)
LYMPHOCYTES # BLD AUTO: 2.1 K/UL (ref 1–5.5)
LYMPHOCYTES NFR BLD AUTO: 29.2 % (ref 20.5–51.5)
MCH RBC QN AUTO: 30 PG (ref 27–31)
MCHC RBC AUTO-ENTMCNC: 34 % (ref 32–36)
MCV RBC AUTO: 88 FL (ref 79–98)
MONOCYTES # BLD MANUAL: 0.6 K/UL (ref 0–1)
MONOCYTES # BLD MANUAL: 8.8 % (ref 1.7–9.3)
NEUTROPHILS # BLD AUTO: 4.3 K/UL (ref 1.8–7.7)
NEUTROPHILS NFR BLD AUTO: 60.1 % (ref 40–70)
PLATELET # BLD AUTO: 267 K/UL (ref 130–430)
RBC # BLD AUTO: 4.63 MIL/UL (ref 4.2–6.2)
WBC # BLD AUTO: 7.1 K/UL (ref 4.8–10.8)

## 2023-04-14 RX ADMIN — SERTRALINE HYDROCHLORIDE SCH MG: 50 TABLET, FILM COATED ORAL at 08:48

## 2023-04-14 RX ADMIN — MORPHINE SULFATE PRN MG: 2 INJECTION, SOLUTION INTRAMUSCULAR; INTRAVENOUS at 19:25

## 2023-04-14 RX ADMIN — ONDANSETRON PRN MG: 2 INJECTION INTRAMUSCULAR; INTRAVENOUS at 19:25

## 2023-04-14 RX ADMIN — SODIUM CHLORIDE SCH MLS/HR: 9 INJECTION, SOLUTION INTRAVENOUS at 19:27

## 2023-04-14 RX ADMIN — SODIUM CHLORIDE SCH MLS/HR: 9 INJECTION, SOLUTION INTRAVENOUS at 23:38

## 2023-04-14 RX ADMIN — METOCLOPRAMIDE PRN MG: 5 INJECTION, SOLUTION INTRAMUSCULAR; INTRAVENOUS at 20:06

## 2023-04-14 RX ADMIN — SODIUM CHLORIDE SCH MLS/HR: 9 INJECTION, SOLUTION INTRAVENOUS at 06:54

## 2023-04-14 RX ADMIN — SODIUM CHLORIDE SCH MG: 9 INJECTION, SOLUTION INTRAVENOUS at 08:47

## 2023-04-14 NOTE — NUR
PM ASSESSMENT;

-Patient is awake, alert, oriented X4. Patient oriented to hospital room, call light, 
toileting, pain management and safety-teach back done. Pt is c/o nausea & vomiting, abd 
pain. Discussed poc, pain mgmt, n/v, pt and mother verbalized understanding. Fall precaution 
in place.  Explained to pt that pain mgmt is given q 4hrs prn. Morphine & Zofran given at 
1925 by day shift nurse. All safety measures in place. Bed alarmed, side rails x3. Call 
light within reach. Cont to monitor pt.

## 2023-04-14 NOTE — NUR
pt has been npo since midnight for mrcp. pt educated not to drink/eat anything. pt 
verbalized understanding

## 2023-04-14 NOTE — NUR
NOTES; NOTIFIED SONU SAENZ REGARDING PT IS C/O OF N/V, ABD PAIN 10/10. INFORMED MD THAT 
PT SAID THAT MORPHINE DOESN'T HELP HER AND SHE WANTS ATIVAN FOR HER ANXIETY. MD ORDERED 
TORADOL 15MG IVP X1 FOR PAIN AND ATIVAN 1MG IVP Q 4 PRN FOR ANXIETY PER MD.

## 2023-04-14 NOTE — NUR
PAGED DR. GONZALEZ, H ( [phone]) NO ANSWER LEFT MESSAGE WITH NUMBER TO CALLBACK 

-Pt is anxious, need Ativan for anxiety and pt is still c/o of abd pain after received pain 
med at 1925. Waiting for md to callback.

## 2023-04-14 NOTE — NUR
ASSUMED CARE OF PATIENT AT THIS TIME. A/O X 4. VSS, AFEBRILE. RESPIRATIONS EVEN AND  
UNLABORED. O2 SAT 99% ON ROOM AIR. IVF INFUSING WITH NO REDNESS OR IRRITATION TO SITE. 
DENIES PAIN AT THIS TIME. NPO FOR MRCP TODAY. NO ACUTE DISTRESS NOTED. WILL CONTINUE TO 
MONITOR FOR SAFETY. VEL GRANADOS RN.

## 2023-04-15 VITALS — SYSTOLIC BLOOD PRESSURE: 111 MMHG

## 2023-04-15 VITALS — SYSTOLIC BLOOD PRESSURE: 129 MMHG

## 2023-04-15 VITALS — SYSTOLIC BLOOD PRESSURE: 137 MMHG

## 2023-04-15 VITALS — SYSTOLIC BLOOD PRESSURE: 145 MMHG

## 2023-04-15 VITALS — SYSTOLIC BLOOD PRESSURE: 125 MMHG

## 2023-04-15 VITALS — SYSTOLIC BLOOD PRESSURE: 114 MMHG

## 2023-04-15 LAB
ALBUMIN SERPL BCP-MCNC: 3.6 G/DL (ref 3.4–4.8)
ALT SERPL W P-5'-P-CCNC: 174 U/L (ref 12–78)
ANION GAP SERPL CALCULATED.3IONS-SCNC: 12 MMOL/L (ref 5–15)
AST SERPL W P-5'-P-CCNC: 23 U/L (ref 10–37)
BASOPHILS # BLD AUTO: 0 K/UL (ref 0–0.2)
BASOPHILS NFR BLD AUTO: 0.2 % (ref 0–2)
BILIRUB DIRECT SERPL-MCNC: 0.1 MG/DL (ref 0–0.3)
BILIRUB SERPL-MCNC: 0.5 MG/DL (ref 0–1)
BUN SERPL-MCNC: 5 MG/DL (ref 8–21)
CALCIUM SERPL-MCNC: 9.7 MG/DL (ref 8.4–11)
CHLORIDE SERPL-SCNC: 99 MMOL/L (ref 98–107)
CREAT SERPL-MCNC: 0.64 MG/DL (ref 0.55–1.3)
EOSINOPHIL # BLD AUTO: 0 K/UL (ref 0–0.4)
EOSINOPHIL NFR BLD AUTO: 0.2 % (ref 0–4)
ERYTHROCYTE [DISTWIDTH] IN BLOOD BY AUTOMATED COUNT: 15.3 % (ref 9–15)
GFR SERPL CREATININE-BSD FRML MDRD: 132 ML/MIN (ref 90–?)
GLUCOSE SERPL-MCNC: 126 MG/DL (ref 70–99)
HCT VFR BLD AUTO: 43.5 % (ref 36–48)
HGB BLD-MCNC: 14.9 G/DL (ref 12–16)
LYMPHOCYTES # BLD AUTO: 1.3 K/UL (ref 1–5.5)
LYMPHOCYTES NFR BLD AUTO: 13.2 % (ref 20.5–51.5)
MCH RBC QN AUTO: 30 PG (ref 27–31)
MCHC RBC AUTO-ENTMCNC: 34 % (ref 32–36)
MCV RBC AUTO: 87 FL (ref 79–98)
MONOCYTES # BLD MANUAL: 0.6 K/UL (ref 0–1)
MONOCYTES # BLD MANUAL: 5.9 % (ref 1.7–9.3)
NEUTROPHILS # BLD AUTO: 8 K/UL (ref 1.8–7.7)
NEUTROPHILS NFR BLD AUTO: 80.5 % (ref 40–70)
PLATELET # BLD AUTO: 353 K/UL (ref 130–430)
RBC # BLD AUTO: 4.99 MIL/UL (ref 4.2–6.2)
WBC # BLD AUTO: 10 K/UL (ref 4.8–10.8)

## 2023-04-15 RX ADMIN — MORPHINE SULFATE PRN MG: 2 INJECTION, SOLUTION INTRAMUSCULAR; INTRAVENOUS at 02:07

## 2023-04-15 RX ADMIN — ONDANSETRON PRN MG: 2 INJECTION INTRAMUSCULAR; INTRAVENOUS at 21:10

## 2023-04-15 RX ADMIN — MORPHINE SULFATE PRN MG: 2 INJECTION, SOLUTION INTRAMUSCULAR; INTRAVENOUS at 08:53

## 2023-04-15 RX ADMIN — SODIUM CHLORIDE SCH MLS/HR: 9 INJECTION, SOLUTION INTRAVENOUS at 21:08

## 2023-04-15 RX ADMIN — METOCLOPRAMIDE SCH MG: 5 INJECTION, SOLUTION INTRAMUSCULAR; INTRAVENOUS at 23:23

## 2023-04-15 RX ADMIN — METOCLOPRAMIDE SCH MG: 5 INJECTION, SOLUTION INTRAMUSCULAR; INTRAVENOUS at 17:15

## 2023-04-15 RX ADMIN — SODIUM CHLORIDE SCH MLS/HR: 9 INJECTION, SOLUTION INTRAVENOUS at 11:00

## 2023-04-15 RX ADMIN — ONDANSETRON PRN MG: 2 INJECTION INTRAMUSCULAR; INTRAVENOUS at 04:46

## 2023-04-15 RX ADMIN — SERTRALINE HYDROCHLORIDE SCH MG: 50 TABLET, FILM COATED ORAL at 08:51

## 2023-04-15 RX ADMIN — METOCLOPRAMIDE PRN MG: 5 INJECTION, SOLUTION INTRAMUSCULAR; INTRAVENOUS at 02:06

## 2023-04-15 RX ADMIN — SODIUM CHLORIDE SCH MG: 9 INJECTION, SOLUTION INTRAVENOUS at 08:51

## 2023-04-15 NOTE — NUR
CLOSING NOTES;

-Pt is asleep. No s/s acute distress noted. All safety measures in place. Bed alarmed, side 
rails x3. Call light within reach. Will endorse to next nurse to cont care.

## 2023-04-15 NOTE — NUR
ROUNDS;

-Pt is resting comfortably. Pt denies any chest pain,pain,sob,or any acute distress. IVF 
infusing well.

VS stable. All side rails padded. Side rails x2. Call light within reach. Cont to monitor p

## 2023-04-15 NOTE — NUR
ROUNDS;

-Pt is asleep comfortably.No s/s any pain,sob,or any acute distress noted. IVF infusing 
well,no s/s any infiltration noted. All safety measures in place. Call light w/in reach. 
Cont to monitor pt.

## 2023-04-15 NOTE — NUR
PM ASSESSMENT;

-Patient is awake, alert, oriented X4. Patient oriented to hospital room, call light, 
toileting, pain management and safety-teach back done. Pt denies any chest pain,pain,sob,n&V 
or any acute distress. Discussed poc, pain mgmt, n/v, pt and mother verbalized 
understanding. Fall precaution in place.  All safety measures in place. Bed alarmed, side 
rails x3. Call light within reach. Cont to monitor pt.

## 2023-04-15 NOTE — NUR
NOTES; NV/PAIN MGMT

-Pt just returned from bathroom and went to bed safely with steady gaits. Pt is c/o n/v and 
abd pain gave Morphine IVP & Reglan IVP. Cont to monitor pt.

## 2023-04-16 VITALS — SYSTOLIC BLOOD PRESSURE: 113 MMHG

## 2023-04-16 VITALS — SYSTOLIC BLOOD PRESSURE: 112 MMHG

## 2023-04-16 VITALS — SYSTOLIC BLOOD PRESSURE: 106 MMHG

## 2023-04-16 LAB
ACTIN IGG SERPL-ACNC: 6 UNITS (ref 0–19)
ANION GAP SERPL CALCULATED.3IONS-SCNC: 10 MMOL/L (ref 5–15)
BASOPHILS # BLD AUTO: 0 K/UL (ref 0–0.2)
BASOPHILS NFR BLD AUTO: 0.5 % (ref 0–2)
BUN SERPL-MCNC: 4 MG/DL (ref 8–21)
CALCIUM SERPL-MCNC: 9.2 MG/DL (ref 8.4–11)
CHLORIDE SERPL-SCNC: 103 MMOL/L (ref 98–107)
CREAT SERPL-MCNC: 0.73 MG/DL (ref 0.55–1.3)
EOSINOPHIL # BLD AUTO: 0.1 K/UL (ref 0–0.4)
EOSINOPHIL NFR BLD AUTO: 1.3 % (ref 0–4)
ERYTHROCYTE [DISTWIDTH] IN BLOOD BY AUTOMATED COUNT: 15.1 % (ref 9–15)
GFR SERPL CREATININE-BSD FRML MDRD: 113 ML/MIN (ref 90–?)
GLUCOSE SERPL-MCNC: 80 MG/DL (ref 70–99)
HCT VFR BLD AUTO: 37.9 % (ref 36–48)
HGB BLD-MCNC: 12.9 G/DL (ref 12–16)
LYMPHOCYTES # BLD AUTO: 2.1 K/UL (ref 1–5.5)
LYMPHOCYTES NFR BLD AUTO: 27.5 % (ref 20.5–51.5)
MCH RBC QN AUTO: 30 PG (ref 27–31)
MCHC RBC AUTO-ENTMCNC: 34 % (ref 32–36)
MCV RBC AUTO: 88 FL (ref 79–98)
MONOCYTES # BLD MANUAL: 0.6 K/UL (ref 0–1)
MONOCYTES # BLD MANUAL: 7.9 % (ref 1.7–9.3)
NEUTROPHILS # BLD AUTO: 4.9 K/UL (ref 1.8–7.7)
NEUTROPHILS NFR BLD AUTO: 62.8 % (ref 40–70)
PLATELET # BLD AUTO: 259 K/UL (ref 130–430)
RBC # BLD AUTO: 4.28 MIL/UL (ref 4.2–6.2)
WBC # BLD AUTO: 7.8 K/UL (ref 4.8–10.8)

## 2023-04-16 RX ADMIN — METOCLOPRAMIDE SCH MG: 5 INJECTION, SOLUTION INTRAMUSCULAR; INTRAVENOUS at 15:54

## 2023-04-16 RX ADMIN — SERTRALINE HYDROCHLORIDE SCH MG: 50 TABLET, FILM COATED ORAL at 10:37

## 2023-04-16 RX ADMIN — METOCLOPRAMIDE SCH MG: 5 INJECTION, SOLUTION INTRAMUSCULAR; INTRAVENOUS at 05:32

## 2023-04-16 RX ADMIN — SODIUM CHLORIDE SCH MLS/HR: 9 INJECTION, SOLUTION INTRAVENOUS at 11:00

## 2023-04-16 RX ADMIN — SODIUM CHLORIDE SCH MG: 9 INJECTION, SOLUTION INTRAVENOUS at 10:26

## 2023-04-16 RX ADMIN — METOCLOPRAMIDE SCH MG: 5 INJECTION, SOLUTION INTRAMUSCULAR; INTRAVENOUS at 18:38

## 2023-04-16 RX ADMIN — SODIUM CHLORIDE SCH MLS/HR: 9 INJECTION, SOLUTION INTRAVENOUS at 22:53

## 2023-04-16 RX ADMIN — SODIUM CHLORIDE SCH MLS/HR: 9 INJECTION, SOLUTION INTRAVENOUS at 15:30

## 2023-04-16 NOTE — NUR
OPENING NOTES

Patient resting in bed no s/s pain or distress noted. Respirations even and unlabored - head 
of bed elevated. IV site patent no s/s redness infection or infiltration. Bed locked and in 
lowest position. Call light within reach.

## 2023-04-16 NOTE — NUR
Dietitian Recommendations 



* Full Liquid diet s/p procedure.  

* If pts PO intake does not improve >3-5 days, consider alternative means of nutrition.  



Submitted for CLARENCE Jaimes by Berna Simmons, MPH, RD



Please see Nutrition Assessment for further details. Thanks!

## 2023-04-16 NOTE — NUR
ROUNDS;

-Pt is resting comfortably. NO s/s any chest pain,pain,sob,or any acute distress noted. IVF 
infusing well. All side rails padded. Side rails x2. Call light within reach. Cont to 
monitor pt.

## 2023-04-16 NOTE — NUR
Nutrition F/U 



Dietetic Intern reviewed pts current EMR including diet hx, physician notes, nursing notes, 
pertinent labs/meds/procedures, care trends and care activity.  



 Admitting Diagnosis  



Vomiting, Abd pain  



Medical History Comment:  



Per EMR: 43 YOF who presented to the ER w/ C/O nausea and intractable vomiting for the past 
few days. Abd pain is diffuse, constant, and moderate intensity associated w/ diarrhea. Was 
recently hospitalized and underwent colonoscopy. Pt was told her symptoms could be IBS, 
GERD, cyclical vomiting syndrome or colitis. Denies melena hematemesis.  

- PMH: IBS  



- Past Sx: Cholecystectomy, tummy [facility] surgery  



4/15 per MD Oconnell: Pt has nausea and vomiting whenever she eats. Gastric emptying study 
pending  consulted GI. Could be cyclical vomiting syndrome vs. Gastroparesis vs. 
Hepatobiliary process. MRCP negative. EGD and [facility] records from [place] 1 month ago were 
normal.  



Subjective Information  



Dietetic Intern spoke w/ pt at bedside. Pt reported feeling OK only when she doesnt eat. 
Pt says shes currently NPO d/t an upcoming procedure/test even though theres still an 
active diet order. Pt requested full liquid diet w/ decaf iced tea for every meal. Pt is 
currently not meeting nutritional needs.  



Current Diet Order/Nutrition Support  



Low fat diet x 1 day  



Patient/Significant Other  



Able To Verbalize 



Pertinent Medications  



NS @ 100mL, Reglan, Protonix, K-Dur, Morphine, Zofran  



Pertinent Labs  



Na 139WNL, K 3.5WNL, BUN 4L (trending down) 



Height (Feet)  



5 feet 



Height (Inches)  



6.00 inches 



Weight (Pounds)  



143 pounds (Stable since 4/13)  



Patient Weight 



64.864 kg 



Body Mass Index 



23.08 kg/m2 



Usual Weight  



163 lbs 



%UBW  



88 



%IBW  



110 



Ideal/Adjusted Body Weight  



130#/59kg  



Recent Weight Change  



Yes - 20# unintentional wt loss within 3 months (12% wt change)  



Weight Status  



Appropriate 



Gastrointestinal Symptoms 



None 



Last BM  



Apr 15, 2023 



Food Allergies  



No - per pt report  



Usual Diet At Home  



Regular per RN screen  



Skin Integrity Comment:  



Jamar: 20 (4/15) 



Wounds: None noted (4/15) 



Edema: None noted (4/15)  



Current % PO  



Poor; avg 29% x 9 meals  



Estimated Energy Expenditure (kcals/day)  



5263-0821 (25-30 kcal/kg CBW [65kg] d/t adult maintenance) 



Estimated Protein Required (g/day)  



52-65 (0.8-1 g/kg CBW d/t adult maintenance)  



Estimated Fluid Required (l/day)  



1.6-2.0 (1mL/kcal for adult maintenance)  



Problem/Etiology/Signs/Symptoms (MODIFIED) 



* Complicated GI function R/T frequent nausea and vomiting AEB pt unable to keep foods down 
whenever she eats (*New).  



* Inadequate oral intake R/T restrictive diet order AEB pt requesting for solid foods and 
not liquids and avg 50% PO intakes (*Resolved). 



Expected Outcomes/Goals  



* Monitor appetite and PO intakes w/ goal of pt meeting >75% of estimated nutritional needs, 
nutrition-related labs trending WNL, controlled BM regime, skin integrity w/ wt. 
maintenance.  



Dietitian Recommendations  



* Full Liquid diet s/p procedure.  



* If pts PO intake does not improve >3-5 days, consider alternative means of nutrition.  



Follow Up  



High Risk: F/U in 2-3days 



GS, MPH, RD

## 2023-04-17 VITALS — SYSTOLIC BLOOD PRESSURE: 115 MMHG

## 2023-04-17 VITALS — SYSTOLIC BLOOD PRESSURE: 124 MMHG

## 2023-04-17 VITALS — SYSTOLIC BLOOD PRESSURE: 94 MMHG

## 2023-04-17 VITALS — SYSTOLIC BLOOD PRESSURE: 117 MMHG

## 2023-04-17 LAB
ALBUMIN SERPL BCP-MCNC: 3.1 G/DL (ref 3.4–4.8)
ALT SERPL W P-5'-P-CCNC: 104 U/L (ref 12–78)
ANION GAP SERPL CALCULATED.3IONS-SCNC: 8 MMOL/L (ref 5–15)
AST SERPL W P-5'-P-CCNC: 21 U/L (ref 10–37)
BASOPHILS # BLD AUTO: 0 K/UL (ref 0–0.2)
BASOPHILS NFR BLD AUTO: 0.5 % (ref 0–2)
BILIRUB SERPL-MCNC: 0.6 MG/DL (ref 0–1)
BUN SERPL-MCNC: 5 MG/DL (ref 8–21)
CALCIUM SERPL-MCNC: 8.6 MG/DL (ref 8.4–11)
CHLAMYDIA IGG SER-ACNC: 1.12 RATIO (ref 0–0.9)
CHLORIDE SERPL-SCNC: 101 MMOL/L (ref 98–107)
CREAT SERPL-MCNC: 0.7 MG/DL (ref 0.55–1.3)
EOSINOPHIL # BLD AUTO: 0.1 K/UL (ref 0–0.4)
EOSINOPHIL NFR BLD AUTO: 1 % (ref 0–4)
ERYTHROCYTE [DISTWIDTH] IN BLOOD BY AUTOMATED COUNT: 15 % (ref 9–15)
GFR SERPL CREATININE-BSD FRML MDRD: 119 ML/MIN (ref 90–?)
GLUCOSE SERPL-MCNC: 91 MG/DL (ref 70–99)
HCT VFR BLD AUTO: 37.8 % (ref 36–48)
HCV AB S/CO SERPL IA: (no result) S/CO (ref 0–0.7)
HCV AB S/CO SERPL IA: (no result) S/CO (ref 0–0.7)
HGB BLD-MCNC: 12.8 G/DL (ref 12–16)
LYMPHOCYTES # BLD AUTO: 2.1 K/UL (ref 1–5.5)
LYMPHOCYTES NFR BLD AUTO: 31.4 % (ref 20.5–51.5)
MCH RBC QN AUTO: 30 PG (ref 27–31)
MCHC RBC AUTO-ENTMCNC: 34 % (ref 32–36)
MCV RBC AUTO: 88 FL (ref 79–98)
MONOCYTES # BLD MANUAL: 0.5 K/UL (ref 0–1)
MONOCYTES # BLD MANUAL: 7.4 % (ref 1.7–9.3)
NEUTROPHILS # BLD AUTO: 4.1 K/UL (ref 1.8–7.7)
NEUTROPHILS NFR BLD AUTO: 59.7 % (ref 40–70)
PLATELET # BLD AUTO: 250 K/UL (ref 130–430)
RBC # BLD AUTO: 4.28 MIL/UL (ref 4.2–6.2)
WBC # BLD AUTO: 6.8 K/UL (ref 4.8–10.8)

## 2023-04-17 RX ADMIN — SERTRALINE HYDROCHLORIDE SCH MG: 50 TABLET, FILM COATED ORAL at 15:28

## 2023-04-17 RX ADMIN — SODIUM CHLORIDE SCH MLS/HR: 9 INJECTION, SOLUTION INTRAVENOUS at 11:30

## 2023-04-17 RX ADMIN — METOCLOPRAMIDE SCH MG: 5 INJECTION, SOLUTION INTRAMUSCULAR; INTRAVENOUS at 11:29

## 2023-04-17 RX ADMIN — SODIUM CHLORIDE SCH MLS/HR: 9 INJECTION, SOLUTION INTRAVENOUS at 21:30

## 2023-04-17 RX ADMIN — METOCLOPRAMIDE SCH MG: 5 INJECTION, SOLUTION INTRAMUSCULAR; INTRAVENOUS at 00:59

## 2023-04-17 RX ADMIN — METOCLOPRAMIDE SCH MG: 5 INJECTION, SOLUTION INTRAMUSCULAR; INTRAVENOUS at 05:33

## 2023-04-17 RX ADMIN — METOCLOPRAMIDE SCH MG: 5 INJECTION, SOLUTION INTRAMUSCULAR; INTRAVENOUS at 18:09

## 2023-04-17 RX ADMIN — SODIUM CHLORIDE SCH MG: 9 INJECTION, SOLUTION INTRAVENOUS at 08:41

## 2023-04-17 NOTE — NUR
CLOSING NOTE;

PT RESTING IN BED, BREATHING NON-LABORED AND REGULAR ON ROOM AIR. IVF RUNNING AS ORDERED 
WITH IV SITE REMAIN INTACT AND PATENT. NO IV INFILTRATION OR INFECTION NOTED. SAFETY 
PRECAUTION IN PLACED. CALL LIGHT WITHIN REACH. NM  THE RESULT OF GASTRIC EMPTYING STUDY IS 
PENDING. WILL ENDORSE CARE TO NIGHT SHIFT NURSE.

## 2023-04-17 NOTE — NUR
OPENING NOTE;

PT SLEEPING IN BED, BREATHING NON-LABORED AND REGULAR ON ROOM AIR. NO S/S OF ANY PAIN OR 
DISCOMFORT. IVF RUNNING AS ORDERED WITH IV SITE REMAIN INTACT AND PATENT. NO IV INFILTRATION 
OR INFECTION NOTED. SAFETY PRECAUTION IN PLACED. CALL LIGHT WITHIN REACH. NM ANISH SAID SHE 
WILL HAVE GASTRIC EMPTYING STUDY AROUND NOON SO CONT TO KEEP NPO STATUS. WILL CONT TO 
MONITOR ANY CHANGES.

## 2023-04-17 NOTE — NUR
CLOSING NOTES

Patient resting in bed no s/s pain or distress noted. Respirations even and unlabored - head 
of bed elevated. IV site patent no s/s redness infection or infiltration. Bed locked and in 
lowest position. Call light within reach.

## 2023-04-18 VITALS — SYSTOLIC BLOOD PRESSURE: 119 MMHG

## 2023-04-18 VITALS — SYSTOLIC BLOOD PRESSURE: 127 MMHG

## 2023-04-18 VITALS — SYSTOLIC BLOOD PRESSURE: 111 MMHG

## 2023-04-18 VITALS — SYSTOLIC BLOOD PRESSURE: 154 MMHG

## 2023-04-18 VITALS — SYSTOLIC BLOOD PRESSURE: 134 MMHG

## 2023-04-18 LAB
ALBUMIN SERPL BCP-MCNC: 3.4 G/DL (ref 3.4–4.8)
ALT SERPL W P-5'-P-CCNC: 111 U/L (ref 12–78)
ANION GAP SERPL CALCULATED.3IONS-SCNC: 11 MMOL/L (ref 5–15)
AST SERPL W P-5'-P-CCNC: 29 U/L (ref 10–37)
BASOPHILS # BLD AUTO: 0 K/UL (ref 0–0.2)
BASOPHILS NFR BLD AUTO: 0.3 % (ref 0–2)
BILIRUB SERPL-MCNC: 0.8 MG/DL (ref 0–1)
BUN SERPL-MCNC: 3 MG/DL (ref 8–21)
CALCIUM SERPL-MCNC: 8.9 MG/DL (ref 8.4–11)
CHLORIDE SERPL-SCNC: 98 MMOL/L (ref 98–107)
CREAT SERPL-MCNC: 0.6 MG/DL (ref 0.55–1.3)
EOSINOPHIL # BLD AUTO: 0.1 K/UL (ref 0–0.4)
EOSINOPHIL NFR BLD AUTO: 0.6 % (ref 0–4)
ERYTHROCYTE [DISTWIDTH] IN BLOOD BY AUTOMATED COUNT: 14.6 % (ref 9–15)
GFR SERPL CREATININE-BSD FRML MDRD: 142 ML/MIN (ref 90–?)
GLUCOSE SERPL-MCNC: 94 MG/DL (ref 70–99)
HCT VFR BLD AUTO: 39.3 % (ref 36–48)
HGB BLD-MCNC: 13.4 G/DL (ref 12–16)
LIPASE SERPL-CCNC: 580 U/L (ref 73–393)
LYMPHOCYTES # BLD AUTO: 1.7 K/UL (ref 1–5.5)
LYMPHOCYTES NFR BLD AUTO: 15.2 % (ref 20.5–51.5)
MCH RBC QN AUTO: 30 PG (ref 27–31)
MCHC RBC AUTO-ENTMCNC: 34 % (ref 32–36)
MCV RBC AUTO: 88 FL (ref 79–98)
MONOCYTES # BLD MANUAL: 0.7 K/UL (ref 0–1)
MONOCYTES # BLD MANUAL: 6.2 % (ref 1.7–9.3)
NEUTROPHILS # BLD AUTO: 8.4 K/UL (ref 1.8–7.7)
NEUTROPHILS NFR BLD AUTO: 77.7 % (ref 40–70)
PHOSPHATE SERPL-MCNC: 3.6 MG/DL (ref 2.7–4.5)
PLATELET # BLD AUTO: 248 K/UL (ref 130–430)
RBC # BLD AUTO: 4.46 MIL/UL (ref 4.2–6.2)
WBC # BLD AUTO: 10.9 K/UL (ref 4.8–10.8)

## 2023-04-18 RX ADMIN — SODIUM CHLORIDE SCH MLS/HR: 9 INJECTION, SOLUTION INTRAVENOUS at 17:05

## 2023-04-18 RX ADMIN — SODIUM CHLORIDE SCH MG: 9 INJECTION, SOLUTION INTRAVENOUS at 09:00

## 2023-04-18 RX ADMIN — MORPHINE SULFATE PRN MG: 2 INJECTION, SOLUTION INTRAMUSCULAR; INTRAVENOUS at 22:57

## 2023-04-18 RX ADMIN — METOCLOPRAMIDE SCH MG: 5 INJECTION, SOLUTION INTRAMUSCULAR; INTRAVENOUS at 18:00

## 2023-04-18 RX ADMIN — SERTRALINE HYDROCHLORIDE SCH MG: 50 TABLET, FILM COATED ORAL at 10:21

## 2023-04-18 RX ADMIN — ONDANSETRON PRN MG: 2 INJECTION INTRAMUSCULAR; INTRAVENOUS at 20:51

## 2023-04-18 RX ADMIN — METOCLOPRAMIDE SCH MG: 5 INJECTION, SOLUTION INTRAMUSCULAR; INTRAVENOUS at 00:00

## 2023-04-18 RX ADMIN — METOCLOPRAMIDE SCH MG: 5 INJECTION, SOLUTION INTRAMUSCULAR; INTRAVENOUS at 12:00

## 2023-04-18 RX ADMIN — METOCLOPRAMIDE SCH MG: 5 INJECTION, SOLUTION INTRAMUSCULAR; INTRAVENOUS at 06:00

## 2023-04-18 RX ADMIN — POTASSIUM CHLORIDE PRN MEQ: 20 TABLET, EXTENDED RELEASE ORAL at 10:21

## 2023-04-18 RX ADMIN — METOCLOPRAMIDE SCH MG: 5 INJECTION, SOLUTION INTRAMUSCULAR; INTRAVENOUS at 22:56

## 2023-04-18 RX ADMIN — SODIUM CHLORIDE SCH MLS/HR: 9 INJECTION, SOLUTION INTRAVENOUS at 07:30

## 2023-04-18 NOTE — NUR
OPENING NOTE;

PT SLEEPING IN BED, NO S/S PAIN OR DISCOMFORT NOTED. BED IS LOCKED AND AT LOW POSITION. CALL 
LIGHT WITHIN REACH. NO IV ACCESS. WILL CONT TO MONITOR FOR ANY CHANGES

## 2023-04-18 NOTE — NUR
CLOSING NOTES

Patient resting in bed no s/s pain or distress noted. Respirations even and unlabored - head 
of bed elevated. No IV access, multiple attempts made throughout shift. Bed locked and in 
lowest position. Call light within reach.

## 2023-04-18 NOTE — NUR
NOTES;

PT RECEIVED DC INSTRUCTION AND SIGNED ON DC PAPER. PT'S MOM WILL COME AND PICK HER UP AROUND 
5PM. CHARGE NURSE MADE AWARE.

## 2023-04-18 NOTE — NUR
IV Insertion

20 Gauge angio placed on right foot.   No signs of infiltration.   Patient tolerated well.  
Primary nurse notified.

## 2023-04-18 NOTE — NUR
CM faxed HH order to [facility] f# [phone]. Contracted home health will contact patient

## 2023-04-18 NOTE — NUR
CLOSING NOTE;

ADMINISTERED TORADOL IM FOR ABDOMINAL PAIN. MIDLINE CONSENT OBTAINED. HOUSE SUPERVISOR MADE 
AWARE. PT VOMITED. ENCOURAGED PT TO KEEP HOB ELEVATED. PT SAID SHE WANTS TO TAKE SOME SLEEP. 
TURN OFF THE LIGHT PER PT REQUEST. NO IV ACCESS. BED IS LOCKED AND AT LOW POSITION. CALL 
LIGHT WITHIN REACH. ENDORSED NIGHT SHIFT NURSE FOR THE CONTINUITY OF CARE.

## 2023-04-18 NOTE — NUR
Met with mother at bedside who wanted to discuss any services that the patient may be 
eligible for at home.  I discussed home health services and explained what they are.  I 
advised the mother that I can have nursing or case management call the drEstephania to get an order 
for home health services for a safety eval.  



I spoke with ABIMAEL Moreno and ABIMAEL Garcia to inquire about HH services.  Nursing will call to 
request a HH order.  The bedside nurse advised that if a HH order is granted, the CM will 
call to let her know that it has been arranged.

## 2023-04-19 VITALS — SYSTOLIC BLOOD PRESSURE: 115 MMHG

## 2023-04-19 VITALS — SYSTOLIC BLOOD PRESSURE: 123 MMHG

## 2023-04-19 VITALS — SYSTOLIC BLOOD PRESSURE: 122 MMHG

## 2023-04-19 VITALS — SYSTOLIC BLOOD PRESSURE: 118 MMHG

## 2023-04-19 VITALS — SYSTOLIC BLOOD PRESSURE: 109 MMHG

## 2023-04-19 VITALS — SYSTOLIC BLOOD PRESSURE: 110 MMHG

## 2023-04-19 LAB
ALBUMIN SERPL BCP-MCNC: 3.4 G/DL (ref 3.4–4.8)
ALT SERPL W P-5'-P-CCNC: 112 U/L (ref 12–78)
ANION GAP SERPL CALCULATED.3IONS-SCNC: 10 MMOL/L (ref 5–15)
APPEARANCE UR: CLEAR
AST SERPL W P-5'-P-CCNC: 24 U/L (ref 10–37)
BACTERIA URNS QL MICRO: (no result) /HPF
BASOPHILS # BLD AUTO: 0 K/UL (ref 0–0.2)
BASOPHILS # BLD AUTO: 0 K/UL (ref 0–0.2)
BASOPHILS NFR BLD AUTO: 0.1 % (ref 0–2)
BASOPHILS NFR BLD AUTO: 0.2 % (ref 0–2)
BILIRUB SERPL-MCNC: 0.7 MG/DL (ref 0–1)
BILIRUB UR QL STRIP: NEGATIVE
BUN SERPL-MCNC: 11 MG/DL (ref 8–21)
CALCIUM SERPL-MCNC: 9.6 MG/DL (ref 8.4–11)
CHLORIDE SERPL-SCNC: 96 MMOL/L (ref 98–107)
COLOR UR: YELLOW
CREAT SERPL-MCNC: 0.71 MG/DL (ref 0.55–1.3)
EOSINOPHIL # BLD AUTO: 0 K/UL (ref 0–0.4)
EOSINOPHIL # BLD AUTO: 0 K/UL (ref 0–0.4)
EOSINOPHIL NFR BLD AUTO: 0 % (ref 0–4)
EOSINOPHIL NFR BLD AUTO: 0 % (ref 0–4)
ERYTHROCYTE [DISTWIDTH] IN BLOOD BY AUTOMATED COUNT: 15 % (ref 9–15)
ERYTHROCYTE [DISTWIDTH] IN BLOOD BY AUTOMATED COUNT: 15 % (ref 9–15)
GFR SERPL CREATININE-BSD FRML MDRD: 117 ML/MIN (ref 90–?)
GLUCOSE SERPL-MCNC: 152 MG/DL (ref 70–99)
GLUCOSE UR STRIP-MCNC: NEGATIVE MG/DL
HCT VFR BLD AUTO: 40.7 % (ref 36–48)
HCT VFR BLD AUTO: 43.8 % (ref 36–48)
HGB BLD-MCNC: 14 G/DL (ref 12–16)
HGB BLD-MCNC: 14.9 G/DL (ref 12–16)
HGB UR QL STRIP: NEGATIVE
KETONES UR STRIP-MCNC: (no result) MG/DL
LEUKOCYTE ESTERASE UR QL STRIP: NEGATIVE
LIPASE SERPL-CCNC: 137 U/L (ref 73–393)
LYMPHOCYTES # BLD AUTO: 0.9 K/UL (ref 1–5.5)
LYMPHOCYTES # BLD AUTO: 1.5 K/UL (ref 1–5.5)
LYMPHOCYTES NFR BLD AUTO: 5.1 % (ref 20.5–51.5)
LYMPHOCYTES NFR BLD AUTO: 7.6 % (ref 20.5–51.5)
MCH RBC QN AUTO: 30 PG (ref 27–31)
MCH RBC QN AUTO: 30 PG (ref 27–31)
MCHC RBC AUTO-ENTMCNC: 34 % (ref 32–36)
MCHC RBC AUTO-ENTMCNC: 34 % (ref 32–36)
MCV RBC AUTO: 86 FL (ref 79–98)
MCV RBC AUTO: 87 FL (ref 79–98)
MONOCYTES # BLD MANUAL: 1.3 K/UL (ref 0–1)
MONOCYTES # BLD MANUAL: 1.7 K/UL (ref 0–1)
MONOCYTES # BLD MANUAL: 7.3 % (ref 1.7–9.3)
MONOCYTES # BLD MANUAL: 8.7 % (ref 1.7–9.3)
MUCOUS THREADS URNS QL MICRO: (no result) /LPF
NEUTROPHILS # BLD AUTO: 15.3 K/UL (ref 1.8–7.7)
NEUTROPHILS # BLD AUTO: 16.1 K/UL (ref 1.8–7.7)
NEUTROPHILS NFR BLD AUTO: 83.5 % (ref 40–70)
NEUTROPHILS NFR BLD AUTO: 87.5 % (ref 40–70)
NITRITE UR QL STRIP: NEGATIVE
PH UR STRIP: 6.5 [PH] (ref 5–8)
PHOSPHATE SERPL-MCNC: 4.8 MG/DL (ref 2.7–4.5)
PLATELET # BLD AUTO: 260 K/UL (ref 130–430)
PLATELET # BLD AUTO: 270 K/UL (ref 130–430)
PROT UR QL STRIP: (no result)
RBC # BLD AUTO: 4.73 MIL/UL (ref 4.2–6.2)
RBC # BLD AUTO: 5.05 MIL/UL (ref 4.2–6.2)
RBC #/AREA URNS HPF: (no result) /HPF (ref 0–3)
SP GR UR STRIP: 1.02 (ref 1–1.03)
UROBILINOGEN UR STRIP-MCNC: 0.2 MG/DL (ref 0.2–1)
WBC # BLD AUTO: 17.5 K/UL (ref 4.8–10.8)
WBC # BLD AUTO: 19.3 K/UL (ref 4.8–10.8)
WBC #/AREA URNS HPF: (no result) /HPF (ref 0–3)

## 2023-04-19 RX ADMIN — SODIUM CHLORIDE SCH MLS/HR: 9 INJECTION, SOLUTION INTRAVENOUS at 03:30

## 2023-04-19 RX ADMIN — METOCLOPRAMIDE SCH MG: 5 INJECTION, SOLUTION INTRAMUSCULAR; INTRAVENOUS at 18:32

## 2023-04-19 RX ADMIN — SODIUM CHLORIDE SCH MLS/HR: 9 INJECTION, SOLUTION INTRAVENOUS at 13:07

## 2023-04-19 RX ADMIN — METOCLOPRAMIDE SCH MG: 5 INJECTION, SOLUTION INTRAMUSCULAR; INTRAVENOUS at 05:26

## 2023-04-19 RX ADMIN — ONDANSETRON PRN MG: 2 INJECTION INTRAMUSCULAR; INTRAVENOUS at 09:49

## 2023-04-19 RX ADMIN — METOCLOPRAMIDE SCH MG: 5 INJECTION, SOLUTION INTRAMUSCULAR; INTRAVENOUS at 12:53

## 2023-04-19 RX ADMIN — ONDANSETRON PRN MG: 2 INJECTION INTRAMUSCULAR; INTRAVENOUS at 03:33

## 2023-04-19 RX ADMIN — METOCLOPRAMIDE SCH MG: 5 INJECTION, SOLUTION INTRAMUSCULAR; INTRAVENOUS at 23:32

## 2023-04-19 RX ADMIN — POTASSIUM CHLORIDE PRN MEQ: 20 TABLET, EXTENDED RELEASE ORAL at 21:09

## 2023-04-19 RX ADMIN — MORPHINE SULFATE PRN MG: 2 INJECTION, SOLUTION INTRAMUSCULAR; INTRAVENOUS at 05:26

## 2023-04-19 RX ADMIN — ONDANSETRON PRN MG: 2 INJECTION INTRAMUSCULAR; INTRAVENOUS at 21:09

## 2023-04-19 RX ADMIN — SODIUM CHLORIDE SCH MG: 9 INJECTION, SOLUTION INTRAVENOUS at 09:49

## 2023-04-19 RX ADMIN — SERTRALINE HYDROCHLORIDE SCH MG: 50 TABLET, FILM COATED ORAL at 09:49

## 2023-04-19 RX ADMIN — MORPHINE SULFATE PRN MG: 2 INJECTION, SOLUTION INTRAMUSCULAR; INTRAVENOUS at 18:42

## 2023-04-19 RX ADMIN — SODIUM CHLORIDE SCH MLS/HR: 9 INJECTION, SOLUTION INTRAVENOUS at 21:26

## 2023-04-19 NOTE — NUR
MORNING ROUNDS:

PATIENT SLEEPING ON HER ABDOMEN DURING ROUNDS. IV FLUIDS ON GOING. CALL LIGHT WITH IN REACH. 
BED LOCKED AT LOWEST POSITION. NOT IN ANY DISTRESS.

## 2023-04-19 NOTE — NUR
Following up to [facility] insurance phone#[phone] for h/h recommendation.

for review and follow up.

-------------------------------------------------------------------------------

Addendum: 04/19/23 at 1508 by Greg BURDEN

-------------------------------------------------------------------------------

follow-up with Ras membreno [facility] h/h phone#[phone]. 

for review and will follow up again.

## 2023-04-19 NOTE — NUR
Dietitian Recommendations 



* Continue low fat diet

* Encourage good PO intakes



GS, MPH, RD



Please refer to Nutrition F/U for further details. Thanks!

## 2023-04-19 NOTE — NUR
Nutrition F/U



RD reviewed pts current EMR including diet hx, physician notes, nursing notes, pertinent 
labs/meds/procedures, care trends and care activity. 



Subjective Information 

Per LOS meeting and EMR review, pt will be DC today. 



Current Diet Order/Nutrition Support 

Low fat x 4 days



% PO intake 

Poor avg of 43% x 5 meal records



Last BM  

None documented   



Estimated Energy Expenditure (kcals/day) 

     4873-5290 (25-30 kcal/kg CBW [65kg] d/t adult maintenance) 

Estimated Protein Required (g/day)  

     52-65 (0.8-1 g/kg CBW d/t adult maintenance)  

Estimated Fluid Required (l/day)  

     1.6-2.0 (1mL/kcal for adult maintenance)  



Problem/Etiology/Signs/Symptoms (MODIFIED) 

* Complicated GI function R/T frequent nausea and vomiting AEB pt unable to keep foods down 
whenever she eats (*New).  

* Inadequate oral intake R/T restrictive diet order AEB pt requesting for solid foods and 
not liquids and avg 50% PO intakes (*Resolved). 



Expected Outcomes/Goals  

* Monitor appetite and PO intakes w/ goal of pt meeting >75% of estimated nutritional needs, 
nutrition-related labs trending WNL, controlled BM regime, skin integrity w/ wt. 
maintenance.  



Dietitian Recommendations  

* Continue low fat diet

* Encourage good PO intakes



DAVID, MPH, RD

## 2023-04-19 NOTE — NUR
SHIFT NOTES:

Patient was received from AM shift nurse at shift change. Patient is AA&Ox4 able to make 
needs known, with call light within reach. Patient was having episodes of N/V and was 
anxious and reporting pain and breathing rapidly. No PIV was noted at this time and patient 
was pending MIDLINE PLACEMENT. No PRN could be administered at this time, charge nurse 
aware, and house supervisor. Safety measures are in place will resume care.



2045:

PIV was inserted 20G on the Left foot by house supervisor. PRN N/V Zofran was administered 
at this time.



2130:

Patient is still very anxious PRN Ativan was administered. Patient is reporting pain will 
call .



2315:

Called Dr. Oconnell and new orders were received for PRN Pain relief. PRN Pain relief was 
administered with schedule Regalan. Patient is still having episodes of N/V. Will continue 
to monitor.



0200:

Patient is still extremely anxious requesting PRN. PRN administered as ordered.



0345:

Patient is still having N/V PRN Zofran and Toradol for Pain were administered at this time. 
Patient reports pain at 10/10.



0500:

Scheduled N/V Regalan administered with PRN pain medication. Patient N/V has decreased some.



0600:

Patient is in bed resting will differ further care to AM shift Nurse.

## 2023-04-19 NOTE — NUR
END OF SHIFT:

IV PAIN MEDS GIVEN PER PATIENT'S REQUEST. CONTINUE TO MONITOR. CALL LIGHT WITH IN REACH. BED 
LOCKED AT LOWEST POSITION. NOT IN ANY DISTRESS.

## 2023-04-20 VITALS — SYSTOLIC BLOOD PRESSURE: 93 MMHG

## 2023-04-20 VITALS — SYSTOLIC BLOOD PRESSURE: 137 MMHG

## 2023-04-20 VITALS — SYSTOLIC BLOOD PRESSURE: 103 MMHG

## 2023-04-20 LAB
ANION GAP SERPL CALCULATED.3IONS-SCNC: 7 MMOL/L (ref 5–15)
BASOPHILS # BLD AUTO: 0.1 K/UL (ref 0–0.2)
BASOPHILS NFR BLD AUTO: 0.9 % (ref 0–2)
BUN SERPL-MCNC: 13 MG/DL (ref 8–21)
CALCIUM SERPL-MCNC: 9.1 MG/DL (ref 8.4–11)
CHLORIDE SERPL-SCNC: 103 MMOL/L (ref 98–107)
CREAT SERPL-MCNC: 0.8 MG/DL (ref 0.55–1.3)
EOSINOPHIL # BLD AUTO: 0 K/UL (ref 0–0.4)
EOSINOPHIL NFR BLD AUTO: 0 % (ref 0–4)
ERYTHROCYTE [DISTWIDTH] IN BLOOD BY AUTOMATED COUNT: 15 % (ref 9–15)
GFR SERPL CREATININE-BSD FRML MDRD: 102 ML/MIN (ref 90–?)
GLUCOSE SERPL-MCNC: 136 MG/DL (ref 70–99)
HCT VFR BLD AUTO: 37.7 % (ref 36–48)
HGB BLD-MCNC: 12.9 G/DL (ref 12–16)
LYMPHOCYTES # BLD AUTO: 0.8 K/UL (ref 1–5.5)
LYMPHOCYTES NFR BLD AUTO: 8.5 % (ref 20.5–51.5)
MCH RBC QN AUTO: 30 PG (ref 27–31)
MCHC RBC AUTO-ENTMCNC: 34 % (ref 32–36)
MCV RBC AUTO: 88 FL (ref 79–98)
MONOCYTES # BLD MANUAL: 0.9 K/UL (ref 0–1)
MONOCYTES # BLD MANUAL: 9.3 % (ref 1.7–9.3)
NEUTROPHILS # BLD AUTO: 7.5 K/UL (ref 1.8–7.7)
NEUTROPHILS NFR BLD AUTO: 81.3 % (ref 40–70)
PHOSPHATE SERPL-MCNC: 2.7 MG/DL (ref 2.7–4.5)
PLATELET # BLD AUTO: 195 K/UL (ref 130–430)
RBC # BLD AUTO: 4.3 MIL/UL (ref 4.2–6.2)
WBC # BLD AUTO: 9.2 K/UL (ref 4.8–10.8)

## 2023-04-20 RX ADMIN — METOCLOPRAMIDE SCH MG: 5 INJECTION, SOLUTION INTRAMUSCULAR; INTRAVENOUS at 05:40

## 2023-04-20 RX ADMIN — ONDANSETRON PRN MG: 2 INJECTION INTRAMUSCULAR; INTRAVENOUS at 03:52

## 2023-04-20 RX ADMIN — MORPHINE SULFATE PRN MG: 2 INJECTION, SOLUTION INTRAMUSCULAR; INTRAVENOUS at 10:12

## 2023-04-20 RX ADMIN — MORPHINE SULFATE PRN MG: 2 INJECTION, SOLUTION INTRAMUSCULAR; INTRAVENOUS at 03:54

## 2023-04-20 RX ADMIN — SERTRALINE HYDROCHLORIDE SCH MG: 50 TABLET, FILM COATED ORAL at 09:00

## 2023-04-20 RX ADMIN — SODIUM CHLORIDE SCH MLS/HR: 9 INJECTION, SOLUTION INTRAVENOUS at 01:49

## 2023-04-20 RX ADMIN — ONDANSETRON PRN MG: 2 INJECTION INTRAMUSCULAR; INTRAVENOUS at 10:04

## 2023-04-20 RX ADMIN — METOCLOPRAMIDE SCH MG: 5 INJECTION, SOLUTION INTRAMUSCULAR; INTRAVENOUS at 12:06

## 2023-04-20 RX ADMIN — SODIUM CHLORIDE SCH MG: 9 INJECTION, SOLUTION INTRAVENOUS at 10:03
